# Patient Record
Sex: FEMALE | URBAN - METROPOLITAN AREA
[De-identification: names, ages, dates, MRNs, and addresses within clinical notes are randomized per-mention and may not be internally consistent; named-entity substitution may affect disease eponyms.]

---

## 2021-02-26 ENCOUNTER — APPOINTMENT (RX ONLY)
Dept: URBAN - METROPOLITAN AREA CLINIC 129 | Facility: CLINIC | Age: 46
Setting detail: DERMATOLOGY
End: 2021-02-26

## 2021-02-26 DIAGNOSIS — L20.89 OTHER ATOPIC DERMATITIS: ICD-10-CM

## 2021-02-26 DIAGNOSIS — L259 CONTACT DERMATITIS AND OTHER ECZEMA, UNSPECIFIED CAUSE: ICD-10-CM

## 2021-02-26 PROBLEM — L23.9 ALLERGIC CONTACT DERMATITIS, UNSPECIFIED CAUSE: Status: ACTIVE | Noted: 2021-02-26

## 2021-02-26 PROCEDURE — ? PRESCRIPTION

## 2021-02-26 PROCEDURE — 99203 OFFICE O/P NEW LOW 30 MIN: CPT

## 2021-02-26 PROCEDURE — ? TREATMENT REGIMEN

## 2021-02-26 PROCEDURE — ? ADDITIONAL NOTES

## 2021-02-26 PROCEDURE — ? COUNSELING

## 2021-02-26 RX ORDER — TRIAMCINOLONE ACETONIDE 1 MG/G
OINTMENT TOPICAL
Qty: 1 | Refills: 1 | Status: ERX | COMMUNITY
Start: 2021-02-26

## 2021-02-26 RX ORDER — FLUOCINOLONE ACETONIDE 0.11 MG/ML
OIL TOPICAL
Qty: 1 | Refills: 11 | Status: ERX | COMMUNITY
Start: 2021-02-26

## 2021-02-26 RX ORDER — HYDROCORTISONE 25 MG/G
OINTMENT TOPICAL
Qty: 1 | Refills: 0 | Status: ERX | COMMUNITY
Start: 2021-02-26

## 2021-02-26 RX ORDER — TACROLIMUS 1 MG/G
OINTMENT TOPICAL
Qty: 1 | Refills: 11 | Status: ERX | COMMUNITY
Start: 2021-02-26

## 2021-02-26 RX ADMIN — FLUOCINOLONE ACETONIDE: 0.11 OIL TOPICAL at 00:00

## 2021-02-26 RX ADMIN — TACROLIMUS: 1 OINTMENT TOPICAL at 00:00

## 2021-02-26 RX ADMIN — TRIAMCINOLONE ACETONIDE: 1 OINTMENT TOPICAL at 00:00

## 2021-02-26 RX ADMIN — HYDROCORTISONE: 25 OINTMENT TOPICAL at 00:00

## 2021-02-26 ASSESSMENT — LOCATION DETAILED DESCRIPTION DERM
LOCATION DETAILED: LEFT INFERIOR CENTRAL MALAR CHEEK
LOCATION DETAILED: LEFT INFERIOR MEDIAL FOREHEAD
LOCATION DETAILED: LEFT MEDIAL FOREHEAD
LOCATION DETAILED: RIGHT INFERIOR MEDIAL FOREHEAD
LOCATION DETAILED: LEFT CENTRAL MALAR CHEEK
LOCATION DETAILED: LEFT MEDIAL FOREHEAD

## 2021-02-26 ASSESSMENT — LOCATION SIMPLE DESCRIPTION DERM
LOCATION SIMPLE: LEFT FOREHEAD
LOCATION SIMPLE: LEFT CHEEK
LOCATION SIMPLE: LEFT CHEEK
LOCATION SIMPLE: RIGHT FOREHEAD
LOCATION SIMPLE: LEFT FOREHEAD

## 2021-02-26 ASSESSMENT — LOCATION ZONE DERM
LOCATION ZONE: FACE
LOCATION ZONE: FACE

## 2021-02-26 NOTE — HPI: RASH
What Type Of Note Output Would You Prefer (Optional)?: Standard Output
How Severe Is Your Rash?: moderate
Is This A New Presentation, Or A Follow-Up?: Rash
Additional History: Pcp diagnosed pt perioral dermatitis and she was prescribed clotrimazole and betamethasone cream. While using medication she did good, but once she stopped she flared.
ambulatory

## 2021-02-26 NOTE — PROCEDURE: TREATMENT REGIMEN
Plan: Patch testing
Detail Level: Zone
Discontinue Regimen: clotrimazole and betamethasone cream
Otc Regimen: Recommended vanicream cream and vanicream soap

## 2021-03-29 ENCOUNTER — APPOINTMENT (RX ONLY)
Dept: URBAN - METROPOLITAN AREA CLINIC 129 | Facility: CLINIC | Age: 46
Setting detail: DERMATOLOGY
End: 2021-03-29

## 2021-03-29 DIAGNOSIS — L259 CONTACT DERMATITIS AND OTHER ECZEMA, UNSPECIFIED CAUSE: ICD-10-CM | Status: INADEQUATELY CONTROLLED

## 2021-03-29 DIAGNOSIS — L20.89 OTHER ATOPIC DERMATITIS: ICD-10-CM | Status: INADEQUATELY CONTROLLED

## 2021-03-29 PROBLEM — L23.9 ALLERGIC CONTACT DERMATITIS, UNSPECIFIED CAUSE: Status: ACTIVE | Noted: 2021-03-29

## 2021-03-29 PROCEDURE — ? ADDITIONAL NOTES

## 2021-03-29 PROCEDURE — 99214 OFFICE O/P EST MOD 30 MIN: CPT

## 2021-03-29 PROCEDURE — ? COUNSELING

## 2021-03-29 PROCEDURE — ? PRESCRIPTION

## 2021-03-29 RX ORDER — TACROLIMUS 1 MG/G
OINTMENT TOPICAL
Qty: 1 | Refills: 7 | Status: ERX

## 2021-03-29 ASSESSMENT — LOCATION SIMPLE DESCRIPTION DERM
LOCATION SIMPLE: LEFT CHEEK
LOCATION SIMPLE: LEFT FOREHEAD
LOCATION SIMPLE: RIGHT FOREHEAD
LOCATION SIMPLE: LEFT FOREHEAD
LOCATION SIMPLE: LEFT CHEEK

## 2021-03-29 ASSESSMENT — LOCATION DETAILED DESCRIPTION DERM
LOCATION DETAILED: LEFT MEDIAL FOREHEAD
LOCATION DETAILED: LEFT MEDIAL FOREHEAD
LOCATION DETAILED: LEFT INFERIOR CENTRAL MALAR CHEEK
LOCATION DETAILED: LEFT INFERIOR MEDIAL FOREHEAD
LOCATION DETAILED: RIGHT INFERIOR MEDIAL FOREHEAD
LOCATION DETAILED: LEFT CENTRAL MALAR CHEEK

## 2021-03-29 ASSESSMENT — LOCATION ZONE DERM
LOCATION ZONE: FACE
LOCATION ZONE: FACE

## 2021-04-05 ENCOUNTER — RX ONLY (OUTPATIENT)
Age: 46
Setting detail: RX ONLY
End: 2021-04-05

## 2021-04-05 RX ORDER — TACROLIMUS 1 MG/G
OINTMENT TOPICAL
Qty: 1 | Refills: 7 | Status: ERX

## 2021-04-19 ENCOUNTER — APPOINTMENT (RX ONLY)
Dept: URBAN - METROPOLITAN AREA CLINIC 129 | Facility: CLINIC | Age: 46
Setting detail: DERMATOLOGY
End: 2021-04-19

## 2021-04-19 DIAGNOSIS — L23.9 ALLERGIC CONTACT DERMATITIS, UNSPECIFIED CAUSE: ICD-10-CM

## 2021-04-19 PROCEDURE — 95044 PATCH/APPLICATION TESTS: CPT

## 2021-04-19 PROCEDURE — ? PATCH TESTING

## 2021-04-21 ENCOUNTER — APPOINTMENT (RX ONLY)
Dept: URBAN - METROPOLITAN AREA CLINIC 129 | Facility: CLINIC | Age: 46
Setting detail: DERMATOLOGY
End: 2021-04-21

## 2021-04-21 DIAGNOSIS — L23.9 ALLERGIC CONTACT DERMATITIS, UNSPECIFIED CAUSE: ICD-10-CM

## 2021-04-21 PROCEDURE — ? CORE ACDS PATCH TEST READING

## 2021-04-21 PROCEDURE — 99213 OFFICE O/P EST LOW 20 MIN: CPT

## 2021-04-21 NOTE — PROCEDURE: CORE ACDS PATCH TEST READING
Allergen 51 Reaction: no reaction
Name Of Allergen 63: Sorbic acid 2% pet
Name Of Allergen 34: Fragrance mix II 14% pet
Show Negative Results In The Note?: Yes
Name Of Allergen 39: Polymyxin B sulfate 3% pet
Name Of Allergen 61: 2-Hydroxy-4-methoxybenzophenone-5-sulfonic acid (benzophenone-4) 2% pet
Name Of Allergen 2: Lanolin alcohol (Amerchol 101) 50% pet
Name Of Allergen 32: Mercaptobenzothiazole 1% pet
Allergen 47 Reaction: irritant reaction
Name Of Allergen 64: Ascencion 2% pet
Name Of Allergen 21: Formaldehyde 2% aq
Name Of Allergen 67: Sorbitan sesquioleate 20% pet
Name Of Allergen 1: Nickel sulfate 2.5% pet
Name Of Allergen 18: Quaternium 15 2% pet
Name Of Allergen 72: Clobetasol-17-propionate 1% pet
Name Of Allergen 37: Propylene glycol 100% aq
Name Of Allergen 3: Neomycin 20% pet
Name Of Allergen 76: Disperse Orange 3 1% pet
Name Of Allergen 58: Cocamide RAJINDER 0.5% pet
Allergen 39 Reaction: 1+
Name Of Allergen 12: Cobalt chloride 1% pet
Name Of Allergen 6: Fragrance mix I 8% pet
Name Of Allergen 73: Amidoamine 0.1% aq
Name Of Allergen 23: 2-Bromo-2-nitropropane-1,3-diol 0.5% pet
Name Of Allergen 52: Chlorhexidine digluconate 0.5% aq
Name Of Allergen 26: Benzocaine 5% pet
Name Of Allergen 55: 2-Hydroxy-4-methoxybenzophenone (benzophenone-3) 10% pet
Name Of Allergen 46: Methyl methacrylate 2% pet
Name Of Allergen 51: Tea tree oil 5% pet
Name Of Allergen 53: Propolis 10% pet
Name Of Allergen 50: Ethyl acrylate 0.1% pet
Name Of Allergen 25: Diazolidinyl urea 1% pet
Name Of Allergen 31: Hydrocortisone-17-butyrate 1% pet
Detail Level: Zone
Name Of Allergen 5: DMDM hydantoin 1% pet
Name Of Allergen 8: Paraben mix 12% pet
Name Of Allergen 56: Tosylamide formaldehyde resin 10% pet
Name Of Allergen 28: Gold sodium thiosulfate 2% pet
Name Of Allergen 24: Thiuram mix 1% pet
Name Of Allergen 47: Lavender absolute 2% pet
Allergen 33 Reaction: 3+
Name Of Allergen 65: Compositae mix II 5% pet
Name Of Allergen 9: Methylisothiazolinone 0.2% aq
Name Of Allergen 71: Triamcinolone 1% pet
Name Of Allergen 45: Decyl glucoside 5% pet
Show Allergen Counseling In The Note?: No
What Reading Time Point?: 48 hour
Name Of Allergen 75: Phenoxyethanol 1% pet
Name Of Allergen 13: w-xgwd-Hsdzkwkfflm formaldehyde resin 1% pet
Name Of Allergen 19: Methyldibromoglutaronitrile 0.5% pet
Name Of Allergen 35: Disperse blue 106/124 mix 1% pet
Name Of Allergen 78: 2,6-Ditert-butyl-4-cresol (BHT) 2% pet
Name Of Allergen 7: Colophony 20% pet
Name Of Allergen 29: Imidazolidinyl urea 2% pet
Name Of Allergen 68: n,n-Diphenylguanidine 1% pet
Number Of Patches Read: 80
Name Of Allergen 4: Potassium dichromate 0.25% pet
Name Of Allergen 69: Cetyl steryl alcohol 20% pet
Name Of Allergen 16: Black rubber mix 0.6% pet
Name Of Allergen 74: Ethyl cyanoacrylate 10% pet
Name Of Allergen 79: 2-Ethylhexyl-4-methoxycinnamate 10% pet
Name Of Allergen 54: Chloroxylenol (PCMX) 1% pet
Name Of Allergen 27: Tixocortol-21-pivalate
Name Of Allergen 14: Epoxy resin 1% pet
Name Of Allergen 36: Lidocaine 15% pet
Name Of Allergen 22: Mercapto mix 1% pet
Name Of Allergen 80: Benzyl alcohol 10% soft
Name Of Allergen 60: Benzalkonium chloride 0.1% aq
Name Of Allergen 62: Sodium benzoate 5% pet
Name Of Allergen 41: Mixed dialkyl thioureas 1% pet
Name Of Allergen 30: Budesonide 0.1% pet
Name Of Allergen 66: Ethyleneurea melamine-formaldehyde 5% pet
Name Of Allergen 70: Ethylhexylglycerin 5% pet
Name Of Allergen 17: Tetracaine hydrochloride 5% pet
Allergen 9 Reaction: 2+
Name Of Allergen 38: Iodopropynyl butylcarbamate 0.1% pet
Name Of Allergen 40: Cocamidopropyl betaine 1% aq
Name Of Allergen 59: 4-Chloro-3-cresol ([facility]) 1% pet
Name Of Allergen 57: Sesquiterpene lactone mix 0.1% pet
Name Of Allergen 48: Cinnamic aldehyde 1% pet
Name Of Allergen 44: Oleamidopropyl dimethylamine 0.1% aq
Name Of Allergen 33: Bacitracin 20% pet
Name Of Allergen 42: 3-(Dimethylamino)-propylamine 1% aq
Name Of Allergen 49: D/L-?-Tocopherol 100%
Name Of Allergen 15: Carba mix 3% pet
Name Of Allergen 77: Benzoic acid 5% pet
Name Of Allergen 20: p-Phenylenediamine 1% pet
Name Of Allergen 11: Ethylenediamine dihydrochloride 1% pet
Name Of Allergen 10: Balsam of [place] 25% pet
Name Of Allergen 43: Hydroxyethyl methacrylate 2% pet

## 2021-04-23 ENCOUNTER — APPOINTMENT (RX ONLY)
Dept: URBAN - METROPOLITAN AREA CLINIC 129 | Facility: CLINIC | Age: 46
Setting detail: DERMATOLOGY
End: 2021-04-23

## 2021-04-23 DIAGNOSIS — L23.9 ALLERGIC CONTACT DERMATITIS, UNSPECIFIED CAUSE: ICD-10-CM

## 2021-04-23 PROCEDURE — ? CORE ACDS PATCH TEST READING

## 2021-04-23 PROCEDURE — ? COUNSELING

## 2021-04-23 PROCEDURE — 99214 OFFICE O/P EST MOD 30 MIN: CPT

## 2021-04-23 NOTE — PROCEDURE: COUNSELING
Detail Level: Zone
Patient Specific Counseling (Will Not Stick From Patient To Patient): pt downloaded [employer] ramirez, I emailed her her safe list

## 2021-04-23 NOTE — PROCEDURE: CORE ACDS PATCH TEST READING
Name Of Allergen 49: D/L-?-Tocopherol 100%
Allergen 41 Reaction: no reaction
Name Of Allergen 74: Ethyl cyanoacrylate 10% pet
Name Of Allergen 15: Carba mix 3% pet
Name Of Allergen 35: Disperse blue 106/124 mix 1% pet
Allergen 34 Reaction: irritant reaction
Name Of Allergen 27: Tixocortol-21-pivalate
Name Of Allergen 67: Sorbitan sesquioleate 20% pet
Name Of Allergen 61: 2-Hydroxy-4-methoxybenzophenone-5-sulfonic acid (benzophenone-4) 2% pet
Name Of Allergen 34: Fragrance mix II 14% pet
Name Of Allergen 32: Mercaptobenzothiazole 1% pet
Name Of Allergen 64: Ascencion 2% pet
Name Of Allergen 30: Budesonide 0.1% pet
Name Of Allergen 63: Sorbic acid 2% pet
Name Of Allergen 3: Neomycin 20% pet
Name Of Allergen 39: Polymyxin B sulfate 3% pet
Name Of Allergen 21: Formaldehyde 2% aq
Name Of Allergen 72: Clobetasol-17-propionate 1% pet
Name Of Allergen 38: Iodopropynyl butylcarbamate 0.1% pet
Name Of Allergen 17: Tetracaine hydrochloride 5% pet
Allergen 39 Reaction: 1+
Name Of Allergen 70: Ethylhexylglycerin 5% pet
Name Of Allergen 52: Chlorhexidine digluconate 0.5% aq
Name Of Allergen 36: Lidocaine 15% pet
Name Of Allergen 18: Quaternium 15 2% pet
Name Of Allergen 22: Mercapto mix 1% pet
Name Of Allergen 57: Sesquiterpene lactone mix 0.1% pet
Name Of Allergen 73: Amidoamine 0.1% aq
Name Of Allergen 1: Nickel sulfate 2.5% pet
Name Of Allergen 48: Cinnamic aldehyde 1% pet
Name Of Allergen 10: Balsam of [place] 25% pet
Name Of Allergen 6: Fragrance mix I 8% pet
Name Of Allergen 11: Ethylenediamine dihydrochloride 1% pet
Name Of Allergen 55: 2-Hydroxy-4-methoxybenzophenone (benzophenone-3) 10% pet
Name Of Allergen 50: Ethyl acrylate 0.1% pet
Name Of Allergen 59: 4-Chloro-3-cresol ([facility]) 1% pet
Name Of Allergen 12: Cobalt chloride 1% pet
Name Of Allergen 46: Methyl methacrylate 2% pet
Name Of Allergen 5: DMDM hydantoin 1% pet
Name Of Allergen 20: p-Phenylenediamine 1% pet
Name Of Allergen 71: Triamcinolone 1% pet
Name Of Allergen 33: Bacitracin 20% pet
Name Of Allergen 23: 2-Bromo-2-nitropropane-1,3-diol 0.5% pet
Name Of Allergen 47: Lavender absolute 2% pet
Name Of Allergen 8: Paraben mix 12% pet
Name Of Allergen 43: Hydroxyethyl methacrylate 2% pet
What Reading Time Point?: 72 hour
Name Of Allergen 28: Gold sodium thiosulfate 2% pet
Name Of Allergen 4: Potassium dichromate 0.25% pet
Name Of Allergen 37: Propylene glycol 100% aq
Name Of Allergen 25: Diazolidinyl urea 1% pet
Show Allergen Counseling In The Note?: No
Name Of Allergen 65: Compositae mix II 5% pet
Name Of Allergen 19: Methyldibromoglutaronitrile 0.5% pet
Name Of Allergen 29: Imidazolidinyl urea 2% pet
Name Of Allergen 75: Phenoxyethanol 1% pet
Name Of Allergen 2: Lanolin alcohol (Amerchol 101) 50% pet
Name Of Allergen 51: Tea tree oil 5% pet
Allergen 33 Reaction: 3+
Name Of Allergen 26: Benzocaine 5% pet
Name Of Allergen 9: Methylisothiazolinone 0.2% aq
Number Of Patches Read: 80
Name Of Allergen 13: r-tpqq-Krhwedpffql formaldehyde resin 1% pet
Name Of Allergen 69: Cetyl steryl alcohol 20% pet
Name Of Allergen 14: Epoxy resin 1% pet
Name Of Allergen 68: n,n-Diphenylguanidine 1% pet
Name Of Allergen 76: Disperse Orange 3 1% pet
Name Of Allergen 16: Black rubber mix 0.6% pet
Name Of Allergen 80: Benzyl alcohol 10% soft
Name Of Allergen 58: Cocamide RAJINDER 0.5% pet
Allergen 9 Reaction: 2+
Name Of Allergen 41: Mixed dialkyl thioureas 1% pet
Detail Level: Zone
Name Of Allergen 54: Chloroxylenol (PCMX) 1% pet
Name Of Allergen 66: Ethyleneurea melamine-formaldehyde 5% pet
Name Of Allergen 60: Benzalkonium chloride 0.1% aq
Name Of Allergen 53: Propolis 10% pet
Name Of Allergen 31: Hydrocortisone-17-butyrate 1% pet
Name Of Allergen 24: Thiuram mix 1% pet
Name Of Allergen 40: Cocamidopropyl betaine 1% aq
Name Of Allergen 79: 2-Ethylhexyl-4-methoxycinnamate 10% pet
Name Of Allergen 7: Colophony 20% pet
Name Of Allergen 56: Tosylamide formaldehyde resin 10% pet
Name Of Allergen 62: Sodium benzoate 5% pet
Name Of Allergen 77: Benzoic acid 5% pet
Name Of Allergen 42: 3-(Dimethylamino)-propylamine 1% aq
Show Negative Results In The Note?: Yes
Name Of Allergen 44: Oleamidopropyl dimethylamine 0.1% aq
Name Of Allergen 78: 2,6-Ditert-butyl-4-cresol (BHT) 2% pet
Name Of Allergen 45: Decyl glucoside 5% pet

## 2022-05-10 ENCOUNTER — OFFICE VISIT (OUTPATIENT)
Dept: URBAN - METROPOLITAN AREA CLINIC 3 | Facility: CLINIC | Age: 47
End: 2022-05-10
Payer: MEDICAID

## 2022-05-10 DIAGNOSIS — H43.313 VITREOUS MEMBRANES AND STRANDS, BILATERAL: ICD-10-CM

## 2022-05-10 DIAGNOSIS — H52.223 REGULAR ASTIGMATISM, BILATERAL: ICD-10-CM

## 2022-05-10 DIAGNOSIS — H40.051 OCULAR HYPERTENSION, RIGHT EYE: ICD-10-CM

## 2022-05-10 DIAGNOSIS — H11.153 PINGUECULA, BILATERAL: ICD-10-CM

## 2022-05-10 DIAGNOSIS — H10.45 OTHER CHRONIC ALLERGIC CONJUNCTIVITIS: ICD-10-CM

## 2022-05-10 PROCEDURE — 92004 COMPRE OPH EXAM NEW PT 1/>: CPT | Performed by: OPTOMETRIST

## 2022-05-10 PROCEDURE — 92250 FUNDUS PHOTOGRAPHY W/I&R: CPT | Performed by: OPTOMETRIST

## 2022-05-10 ASSESSMENT — INTRAOCULAR PRESSURE
OS: 21
OD: 15

## 2022-05-10 NOTE — IMPRESSION/PLAN
Impression: Open angle with borderline findings, low risk, bilateral: H40.013. Plan: Glaucoma suspect due to large cup to disc ratio both eyes The pathophysiology of glaucoma and the difference between having glaucoma and being a glaucoma suspect were discussed with the patient. A  Tripp 24-2 visual field, nerve fiber layer analysis by OCT, gonioscopy and pachymetry were ordered. Retinal photos were taken today and shown to the patient. All of the patients questions were answered and they stated they understand their finding.

## 2022-05-10 NOTE — IMPRESSION/PLAN
Impression: Ocular hypertension, right eye: H40.051. Plan: IOP 21/19 today. Fundus photos taken. Patient educated. Monitor.

## 2022-05-10 NOTE — IMPRESSION/PLAN
Impression: Other chronic allergic conjunctivitis: H10.45. Plan: Start prednisolone acetate every 2 hours while awake for 4 days then four times a day for 4 days then, three times a day for 3 days then, twice a day for 2 days then, one time a day for 1 day then, STOP.(SHAKE WELL) Recommend use of Systane ultra 4 times a day. Return immediately for increased pain, increased redness, or decreased vision.

## 2022-05-19 ENCOUNTER — OFFICE VISIT (OUTPATIENT)
Dept: URBAN - METROPOLITAN AREA CLINIC 3 | Facility: CLINIC | Age: 47
End: 2022-05-19
Payer: MEDICAID

## 2022-05-19 DIAGNOSIS — H40.013 OPEN ANGLE WITH BORDERLINE FINDINGS, LOW RISK, BILATERAL: Primary | ICD-10-CM

## 2022-05-19 PROCEDURE — 92012 INTRM OPH EXAM EST PATIENT: CPT | Performed by: OPTOMETRIST

## 2022-05-19 PROCEDURE — 92133 CPTRZD OPH DX IMG PST SGM ON: CPT | Performed by: OPTOMETRIST

## 2022-05-19 PROCEDURE — 76514 ECHO EXAM OF EYE THICKNESS: CPT | Performed by: OPTOMETRIST

## 2022-05-19 PROCEDURE — 92020 GONIOSCOPY: CPT | Performed by: OPTOMETRIST

## 2022-05-19 PROCEDURE — 92083 EXTENDED VISUAL FIELD XM: CPT | Performed by: OPTOMETRIST

## 2022-05-19 ASSESSMENT — INTRAOCULAR PRESSURE
OD: 14
OS: 15

## 2022-05-19 NOTE — IMPRESSION/PLAN
Impression: Open angle with borderline findings, low risk, bilateral: H40.013. Plan: RNFL OCT scan shows average thicknesses of 97/97 and no focal thin quadrants in either eye. Humphery Visual Field 24-2 reliable both eyes without glaucomatous patterns in either eye. Intraocular pressure is 14/15 and pachymetry revealed 563/556 corneal thicknesses. Gonioscopy Garcia grading 3 with 1+ pigment both eyes. The results of testing was reviewed with the patient. The patients questions were answered and stated they understood the findings and need for regular monitoring.

## 2022-11-30 ENCOUNTER — OFFICE VISIT (OUTPATIENT)
Dept: URBAN - METROPOLITAN AREA CLINIC 3 | Facility: CLINIC | Age: 47
End: 2022-11-30
Payer: MEDICAID

## 2022-11-30 DIAGNOSIS — H40.013 OPEN ANGLE WITH BORDERLINE FINDINGS, LOW RISK, BILATERAL: Primary | ICD-10-CM

## 2022-11-30 PROCEDURE — 92083 EXTENDED VISUAL FIELD XM: CPT | Performed by: OPTOMETRIST

## 2022-11-30 PROCEDURE — 92012 INTRM OPH EXAM EST PATIENT: CPT | Performed by: OPTOMETRIST

## 2022-11-30 PROCEDURE — 92133 CPTRZD OPH DX IMG PST SGM ON: CPT | Performed by: OPTOMETRIST

## 2022-11-30 ASSESSMENT — INTRAOCULAR PRESSURE
OS: 17
OD: 17

## 2022-11-30 NOTE — IMPRESSION/PLAN
Impression: Open angle with borderline findings, low risk, bilateral: H40.013. Plan: RNFL OCT scan shows average thicknesses of 101/96 and no thin quadrants in either eye. Humphery Visual Field 24-2 reliable both eyes without glaucomatous patterns. Intraocular pressure is 17/17. Stable compared to last. The results of testing was reviewed with the patient.  The patients questions were answered and stated they understood the findings and need for regular monitoring

## 2023-05-30 ENCOUNTER — OFFICE VISIT (OUTPATIENT)
Dept: URBAN - METROPOLITAN AREA CLINIC 3 | Facility: CLINIC | Age: 48
End: 2023-05-30
Payer: MEDICAID

## 2023-05-30 DIAGNOSIS — H40.013 OPEN ANGLE WITH BORDERLINE FINDINGS, LOW RISK, BILATERAL: Primary | ICD-10-CM

## 2023-05-30 DIAGNOSIS — H43.313 VITREOUS MEMBRANES AND STRANDS, BILATERAL: ICD-10-CM

## 2023-05-30 DIAGNOSIS — H11.153 PINGUECULA, BILATERAL: ICD-10-CM

## 2023-05-30 PROCEDURE — 92250 FUNDUS PHOTOGRAPHY W/I&R: CPT | Performed by: OPTOMETRIST

## 2023-05-30 PROCEDURE — 92014 COMPRE OPH EXAM EST PT 1/>: CPT | Performed by: OPTOMETRIST

## 2023-05-30 ASSESSMENT — INTRAOCULAR PRESSURE
OD: 18
OS: 18

## 2023-06-09 NOTE — IMPRESSION/PLAN
Impression: Pinguecula, bilateral: H11.153. Plan: Recommended UV protection when outdoors (hat / UV rated sunglasses). Discussed the benefit of using lubricant eye drops. In an effort to ensure that our patients [employer], a Team Member has reviewed your chart and identified an opportunity to provide the best care possible. An attempt was made to discuss or schedule overdue Preventive or Disease Management screening.     The Outcome was Contact was not made, left message If you have any questions or need help with scheduling, contact your primary care provider.. Care Gaps include Diabetes.

## 2023-12-11 ENCOUNTER — OFFICE VISIT (OUTPATIENT)
Dept: URBAN - METROPOLITAN AREA CLINIC 3 | Facility: CLINIC | Age: 48
End: 2023-12-11
Payer: MEDICAID

## 2023-12-11 DIAGNOSIS — H40.013 OPEN ANGLE WITH BORDERLINE FINDINGS, LOW RISK, BILATERAL: Primary | ICD-10-CM

## 2023-12-11 PROCEDURE — 92012 INTRM OPH EXAM EST PATIENT: CPT | Performed by: OPTOMETRIST

## 2023-12-11 PROCEDURE — 92133 CPTRZD OPH DX IMG PST SGM ON: CPT | Performed by: OPTOMETRIST

## 2023-12-11 PROCEDURE — 92083 EXTENDED VISUAL FIELD XM: CPT | Performed by: OPTOMETRIST

## 2023-12-11 ASSESSMENT — INTRAOCULAR PRESSURE
OS: 14
OD: 14